# Patient Record
Sex: MALE | Race: OTHER | HISPANIC OR LATINO | URBAN - METROPOLITAN AREA
[De-identification: names, ages, dates, MRNs, and addresses within clinical notes are randomized per-mention and may not be internally consistent; named-entity substitution may affect disease eponyms.]

---

## 2017-02-03 ENCOUNTER — OUTPATIENT (OUTPATIENT)
Dept: OUTPATIENT SERVICES | Facility: HOSPITAL | Age: 42
LOS: 1 days | End: 2017-02-03
Payer: MEDICAID

## 2017-02-03 ENCOUNTER — APPOINTMENT (OUTPATIENT)
Dept: MRI IMAGING | Facility: CLINIC | Age: 42
End: 2017-02-03

## 2017-02-03 DIAGNOSIS — D36.10 BENIGN NEOPLASM OF PERIPHERAL NERVES AND AUTONOMIC NERVOUS SYSTEM, UNSPECIFIED: ICD-10-CM

## 2017-02-03 PROCEDURE — 82565 ASSAY OF CREATININE: CPT

## 2017-02-03 PROCEDURE — 70553 MRI BRAIN STEM W/O & W/DYE: CPT

## 2017-02-03 PROCEDURE — A9585: CPT

## 2017-02-13 ENCOUNTER — APPOINTMENT (OUTPATIENT)
Dept: OTOLARYNGOLOGY | Facility: CLINIC | Age: 42
End: 2017-02-13

## 2017-02-13 VITALS
SYSTOLIC BLOOD PRESSURE: 127 MMHG | HEART RATE: 97 BPM | WEIGHT: 184 LBS | HEIGHT: 62 IN | BODY MASS INDEX: 33.86 KG/M2 | DIASTOLIC BLOOD PRESSURE: 67 MMHG

## 2017-03-01 ENCOUNTER — APPOINTMENT (OUTPATIENT)
Dept: SPINE | Facility: CLINIC | Age: 42
End: 2017-03-01

## 2017-03-01 VITALS
HEIGHT: 61 IN | DIASTOLIC BLOOD PRESSURE: 79 MMHG | HEART RATE: 111 BPM | SYSTOLIC BLOOD PRESSURE: 113 MMHG | BODY MASS INDEX: 35.87 KG/M2 | WEIGHT: 190 LBS

## 2017-03-01 RX ORDER — DEXTROAMPHETAMINE SACCHARATE AND AMPHETAMINE ASPARTATE AND DEXTROAMPHETAMINE SULFATE AND AMPHETAMINE SULFATE 3.75; 3.75; 3.75; 3.75 MG/1; MG/1; MG/1; MG/1
15 TABLET ORAL
Refills: 0 | Status: ACTIVE | COMMUNITY

## 2017-03-01 RX ORDER — CLONAZEPAM 0.5 MG/1
0.5 TABLET ORAL
Refills: 0 | Status: ACTIVE | COMMUNITY

## 2017-03-01 RX ORDER — CALCIUM CARBONATE/VITAMIN D3 600 MG-10
TABLET ORAL
Refills: 0 | Status: ACTIVE | COMMUNITY

## 2017-06-12 ENCOUNTER — APPOINTMENT (OUTPATIENT)
Dept: OTOLARYNGOLOGY | Facility: CLINIC | Age: 42
End: 2017-06-12

## 2017-06-12 VITALS
SYSTOLIC BLOOD PRESSURE: 123 MMHG | DIASTOLIC BLOOD PRESSURE: 82 MMHG | HEIGHT: 61 IN | BODY MASS INDEX: 35.87 KG/M2 | HEART RATE: 110 BPM | WEIGHT: 190 LBS

## 2017-06-12 DIAGNOSIS — H40.003 PREGLAUCOMA, UNSPECIFIED, BILATERAL: ICD-10-CM

## 2017-12-11 ENCOUNTER — APPOINTMENT (OUTPATIENT)
Dept: OTOLARYNGOLOGY | Facility: CLINIC | Age: 42
End: 2017-12-11
Payer: MEDICAID

## 2017-12-11 VITALS
HEART RATE: 84 BPM | BODY MASS INDEX: 35.87 KG/M2 | DIASTOLIC BLOOD PRESSURE: 74 MMHG | WEIGHT: 190 LBS | SYSTOLIC BLOOD PRESSURE: 117 MMHG | HEIGHT: 61 IN

## 2017-12-11 PROCEDURE — 92557 COMPREHENSIVE HEARING TEST: CPT

## 2017-12-11 PROCEDURE — 99214 OFFICE O/P EST MOD 30 MIN: CPT | Mod: 25

## 2017-12-11 PROCEDURE — 92567 TYMPANOMETRY: CPT

## 2018-03-06 ENCOUNTER — APPOINTMENT (OUTPATIENT)
Dept: MRI IMAGING | Facility: CLINIC | Age: 43
End: 2018-03-06
Payer: MEDICAID

## 2018-03-06 ENCOUNTER — OUTPATIENT (OUTPATIENT)
Dept: OUTPATIENT SERVICES | Facility: HOSPITAL | Age: 43
LOS: 1 days | End: 2018-03-06
Payer: MEDICAID

## 2018-03-06 DIAGNOSIS — D36.10 BENIGN NEOPLASM OF PERIPHERAL NERVES AND AUTONOMIC NERVOUS SYSTEM, UNSPECIFIED: ICD-10-CM

## 2018-03-06 PROCEDURE — A9585: CPT

## 2018-03-06 PROCEDURE — 70553 MRI BRAIN STEM W/O & W/DYE: CPT | Mod: 26

## 2018-03-06 PROCEDURE — 70553 MRI BRAIN STEM W/O & W/DYE: CPT

## 2018-03-28 ENCOUNTER — APPOINTMENT (OUTPATIENT)
Dept: SPINE | Facility: CLINIC | Age: 43
End: 2018-03-28
Payer: MEDICAID

## 2018-03-28 VITALS
HEART RATE: 102 BPM | BODY MASS INDEX: 29.83 KG/M2 | WEIGHT: 158 LBS | HEIGHT: 61 IN | DIASTOLIC BLOOD PRESSURE: 71 MMHG | SYSTOLIC BLOOD PRESSURE: 103 MMHG

## 2018-03-28 PROCEDURE — 99214 OFFICE O/P EST MOD 30 MIN: CPT

## 2018-03-28 RX ORDER — FLUTICASONE PROPIONATE 50 UG/1
50 SPRAY, METERED NASAL DAILY
Qty: 1 | Refills: 10 | Status: COMPLETED | COMMUNITY
Start: 2017-06-12 | End: 2018-03-28

## 2018-03-28 RX ORDER — FLUTICASONE PROPIONATE 50 UG/1
50 SPRAY, METERED NASAL DAILY
Qty: 1 | Refills: 10 | Status: COMPLETED | COMMUNITY
Start: 2017-02-13 | End: 2018-03-28

## 2018-04-16 ENCOUNTER — APPOINTMENT (OUTPATIENT)
Dept: OTOLARYNGOLOGY | Facility: CLINIC | Age: 43
End: 2018-04-16
Payer: MEDICAID

## 2018-04-16 VITALS
HEIGHT: 61 IN | BODY MASS INDEX: 30.02 KG/M2 | HEART RATE: 82 BPM | DIASTOLIC BLOOD PRESSURE: 77 MMHG | SYSTOLIC BLOOD PRESSURE: 123 MMHG | WEIGHT: 159 LBS

## 2018-04-16 PROCEDURE — 99214 OFFICE O/P EST MOD 30 MIN: CPT | Mod: 25

## 2018-04-16 PROCEDURE — 69210 REMOVE IMPACTED EAR WAX UNI: CPT

## 2018-04-16 RX ORDER — NEOMYCIN AND POLYMYXIN B SULFATES AND HYDROCORTISONE OTIC 10; 3.5; 1 MG/ML; MG/ML; [USP'U]/ML
3.5-10000-1 SUSPENSION AURICULAR (OTIC)
Qty: 10 | Refills: 5 | Status: ACTIVE | COMMUNITY
Start: 2018-04-16 | End: 1900-01-01

## 2018-04-30 ENCOUNTER — APPOINTMENT (OUTPATIENT)
Dept: OTOLARYNGOLOGY | Facility: CLINIC | Age: 43
End: 2018-04-30
Payer: MEDICAID

## 2018-04-30 VITALS
WEIGHT: 159 LBS | SYSTOLIC BLOOD PRESSURE: 112 MMHG | HEIGHT: 61 IN | BODY MASS INDEX: 30.02 KG/M2 | DIASTOLIC BLOOD PRESSURE: 66 MMHG | HEART RATE: 65 BPM

## 2018-04-30 PROCEDURE — 69210 REMOVE IMPACTED EAR WAX UNI: CPT

## 2018-04-30 PROCEDURE — 99213 OFFICE O/P EST LOW 20 MIN: CPT | Mod: 25

## 2018-08-20 ENCOUNTER — APPOINTMENT (OUTPATIENT)
Dept: OPHTHALMOLOGY | Facility: CLINIC | Age: 43
End: 2018-08-20
Payer: MEDICAID

## 2018-08-20 PROCEDURE — 92014 COMPRE OPH EXAM EST PT 1/>: CPT

## 2018-08-20 PROCEDURE — 92015 DETERMINE REFRACTIVE STATE: CPT

## 2018-08-29 ENCOUNTER — APPOINTMENT (OUTPATIENT)
Dept: OTOLARYNGOLOGY | Facility: CLINIC | Age: 43
End: 2018-08-29

## 2018-09-27 ENCOUNTER — APPOINTMENT (OUTPATIENT)
Dept: OTOLARYNGOLOGY | Facility: CLINIC | Age: 43
End: 2018-09-27
Payer: MEDICAID

## 2018-09-27 VITALS
HEART RATE: 67 BPM | SYSTOLIC BLOOD PRESSURE: 100 MMHG | DIASTOLIC BLOOD PRESSURE: 64 MMHG | HEIGHT: 61 IN | BODY MASS INDEX: 30.02 KG/M2 | WEIGHT: 159 LBS

## 2018-09-27 PROCEDURE — 69220 CLEAN OUT MASTOID CAVITY: CPT | Mod: LT

## 2018-09-27 PROCEDURE — 92504 EAR MICROSCOPY EXAMINATION: CPT | Mod: 59

## 2018-09-27 PROCEDURE — 99214 OFFICE O/P EST MOD 30 MIN: CPT | Mod: 25

## 2019-01-17 ENCOUNTER — APPOINTMENT (OUTPATIENT)
Dept: OTOLARYNGOLOGY | Facility: CLINIC | Age: 44
End: 2019-01-17
Payer: MEDICAID

## 2019-01-17 VITALS
SYSTOLIC BLOOD PRESSURE: 112 MMHG | DIASTOLIC BLOOD PRESSURE: 66 MMHG | HEIGHT: 61 IN | WEIGHT: 166 LBS | BODY MASS INDEX: 31.34 KG/M2 | HEART RATE: 81 BPM

## 2019-01-17 PROCEDURE — 99214 OFFICE O/P EST MOD 30 MIN: CPT | Mod: 25

## 2019-01-17 PROCEDURE — 69210 REMOVE IMPACTED EAR WAX UNI: CPT

## 2019-01-17 NOTE — REASON FOR VISIT
[Subsequent Evaluation] : a subsequent evaluation for [Hearing Loss] : hearing loss [Nasal Obstruction] : nasal obstruction

## 2019-01-17 NOTE — PHYSICAL EXAM
[Nasal Endoscopy Performed] : nasal endoscopy was performed, see procedure section for findings [] : septum deviated bilaterally [de-identified] : congested [Midline] : trachea located in midline position [Binocular Microscopic Exam] : Binocular microscopic exam was performed [Normal] : the right mastoid was normal [Hearing Loss Right Only] : normal [Hearing Loss Left Only] : normal [FreeTextEntry8] : wax [FreeTextEntry9] : wax [de-identified] : attic defect debrided on scant debris [de-identified] : debris and cerumen moist debrided and otic drops placed

## 2019-01-17 NOTE — ASSESSMENT
[FreeTextEntry1] : significant history of cholesteatoma and chronic mastoiditis in the left ear\par With a significant history of a left acoustic neuroma treated with radiation therapy\par The mastoid is now clean  \par needs routine cleaning\par \par Right  ear:\par After informed verbal consent is obtained, cerumen is removed from the right and left  ear canal with a curette and suction.\par Rec: \par Debrox was prescribed and  is to be placed in both ears on a routine basis to keep them free of wax.\par Routine debridement suggested to keep the ears free of wax.\par \par bilateral sensorineural hearing loss-cleared for hearing aids\par Sugery not indicated for right ear since it is the better hearing ear

## 2019-01-17 NOTE — HISTORY OF PRESENT ILLNESS
[T plasty] : tympanoplasty [Mastoid] : mastoid surgery [Yes] : patient has a history radiation therapy [de-identified] : 42 yo\par no ear drainage bilat\par interested in hearing better\par Right ear better hearing ear\par AVELINA-stable\par hx-\par patient has a significant history of a left acoustic neuroma and cholesteatoma\par He has been treated with a left mastoidectomy for removal of cholesteatoma and radiation therapy for the acoustic neuroma\par there is a significant history of nasal congestion and AVELINA-now much less due to 30 loss\par congestion despite nasal sprays\par  [Tinnitus] : tinnitus [Hearing Loss] : hearing loss [Acoustic Neuroma] : acoustic neuroma [Cholesteatoma] : cholesteatoma [Nasal Congestion] : nasal congestion [None] : No risk factors have been identified.

## 2019-03-26 ENCOUNTER — OTHER (OUTPATIENT)
Age: 44
End: 2019-03-26

## 2019-04-01 ENCOUNTER — OUTPATIENT (OUTPATIENT)
Dept: OUTPATIENT SERVICES | Facility: HOSPITAL | Age: 44
LOS: 1 days | End: 2019-04-01
Payer: MEDICAID

## 2019-04-01 ENCOUNTER — APPOINTMENT (OUTPATIENT)
Age: 44
End: 2019-04-01
Payer: MEDICAID

## 2019-04-01 DIAGNOSIS — D33.3 BENIGN NEOPLASM OF CRANIAL NERVES: ICD-10-CM

## 2019-04-01 PROCEDURE — 70553 MRI BRAIN STEM W/O & W/DYE: CPT | Mod: 26

## 2019-04-01 PROCEDURE — 70553 MRI BRAIN STEM W/O & W/DYE: CPT

## 2019-04-01 PROCEDURE — A9585: CPT

## 2019-04-03 ENCOUNTER — APPOINTMENT (OUTPATIENT)
Dept: SPINE | Facility: CLINIC | Age: 44
End: 2019-04-03
Payer: MEDICAID

## 2019-04-03 VITALS
BODY MASS INDEX: 31.72 KG/M2 | DIASTOLIC BLOOD PRESSURE: 73 MMHG | HEIGHT: 61 IN | HEART RATE: 76 BPM | WEIGHT: 168 LBS | SYSTOLIC BLOOD PRESSURE: 112 MMHG

## 2019-04-03 DIAGNOSIS — D33.3 BENIGN NEOPLASM OF CRANIAL NERVES: ICD-10-CM

## 2019-04-03 PROCEDURE — 99214 OFFICE O/P EST MOD 30 MIN: CPT

## 2019-04-05 ENCOUNTER — OTHER (OUTPATIENT)
Age: 44
End: 2019-04-05

## 2019-05-15 ENCOUNTER — APPOINTMENT (OUTPATIENT)
Dept: OTOLARYNGOLOGY | Facility: CLINIC | Age: 44
End: 2019-05-15

## 2019-07-15 ENCOUNTER — LABORATORY RESULT (OUTPATIENT)
Age: 44
End: 2019-07-15

## 2019-07-15 ENCOUNTER — APPOINTMENT (OUTPATIENT)
Dept: OTOLARYNGOLOGY | Facility: CLINIC | Age: 44
End: 2019-07-15
Payer: MEDICAID

## 2019-07-15 VITALS
BODY MASS INDEX: 31.72 KG/M2 | HEART RATE: 80 BPM | DIASTOLIC BLOOD PRESSURE: 80 MMHG | SYSTOLIC BLOOD PRESSURE: 119 MMHG | WEIGHT: 168 LBS | HEIGHT: 61 IN

## 2019-07-15 PROCEDURE — 92504 EAR MICROSCOPY EXAMINATION: CPT | Mod: 59

## 2019-07-15 PROCEDURE — 99214 OFFICE O/P EST MOD 30 MIN: CPT | Mod: 25

## 2019-07-15 PROCEDURE — 69540 EXCISION AURAL POLYP: CPT | Mod: LT

## 2019-07-15 RX ORDER — SULFACETAMIDE SODIUM AND PREDNISOLONE SODIUM PHOSPHATE 100; 2.3 MG/ML; MG/ML
10-0.23 SOLUTION/ DROPS OPHTHALMIC
Qty: 2 | Refills: 2 | Status: ACTIVE | COMMUNITY
Start: 2019-07-15 | End: 1900-01-01

## 2019-07-15 NOTE — REVIEW OF SYSTEMS
[Patient Intake Form Reviewed] : Patient intake form was reviewed [As Noted in HPI] : as noted in HPI [Negative] : Heme/Lymph [Ear Pain] : ear pain [Hearing Loss] : hearing loss [Ear Drainage] : ear drainage [Nasal Congestion] : nasal congestion

## 2019-07-15 NOTE — DATA REVIEWED
[No studies available for review at this time] : No studies available for review at this time [de-identified] : stable hearing bilaterally

## 2019-07-15 NOTE — REASON FOR VISIT
[Subsequent Evaluation] : a subsequent evaluation for [Hearing Loss] : hearing loss [Nasal Obstruction] : nasal obstruction [FreeTextEntry2] : L ear drainage

## 2019-07-15 NOTE — PROCEDURE
[Risk and Benefits Discussed] : The purpose, risks, discomforts, benefits and alternatives of the procedure have been explained to the patient including no treatment. [Cerumen Impaction] : Cerumen Impaction [Same] : same as the Pre Op Dx. [] : Removal of Cerumen [FreeTextEntry1] : left aural polyp chr drainage [FreeTextEntry6] : the microscope was necessary for illumination and magnification\par mastoid debrided,wax removed w/ suction and currette\par Ant/sup aural polyp noted in middle ear space

## 2019-07-15 NOTE — ASSESSMENT
[FreeTextEntry1] : 43 y/o male w/ significant history of cholesteatoma and chronic mastoiditis in the left ear and left acoustic neuroma treated with radiation therapy, He is now having L ear drainage and intermittent pain. L ear polyp and moist debris is noted in L ear canal on exam\par \par L ear polyp was removed in office and specimen will be sent out to pathology\par L  chr mastoidits\par -Rx: belphamide drops to left ear \par \par bilateral sensorineural hearing loss-continue using hearing aide in R ear as it is helping him\par \par Will f/u at the end of July \par h2o precautions

## 2019-07-15 NOTE — PHYSICAL EXAM
[] : septum deviated bilaterally [Midline] : trachea located in midline position [Binocular Microscopic Exam] : Binocular microscopic exam was performed [Normal] : the right mastoid was normal [de-identified] : L polyp and pus noted in L ear  [de-identified] : healed perforation of TM  [de-identified] : congested [Hearing Loss Right Only] : normal [Hearing Loss Left Only] : normal [FreeTextEntry8] : wax [FreeTextEntry9] : wax [de-identified] : attic defect debrided on scant debris [de-identified] : debris and cerumen moist debrided and otic drops placed

## 2019-07-15 NOTE — HISTORY OF PRESENT ILLNESS
[T plasty] : tympanoplasty [Mastoid] : mastoid surgery [Yes] : patient has a history radiation therapy [Tinnitus] : tinnitus [Hearing Loss] : hearing loss [Acoustic Neuroma] : acoustic neuroma [Cholesteatoma] : cholesteatoma [Nasal Congestion] : nasal congestion [None] : No risk factors have been identified. [de-identified] : 43 y/o male w/ significant history of of AVELINA, left acoustic neuroma and cholesteatoma. He has been treated with a left mastoidectomy for removal of cholesteatoma and radiation therapy for the acoustic neuroma. He is here for his annual f/u. He state his L ear has been draining for a couple of months. He has been having intermittent L ear pain. His hearing has diminished in his L ear. He is using a hearing aide in the R ear which is helping him. He states he has been having mild nasal congestion on a daily basis. He has no other modifying factors. Pt has no vertigo,nasal discharge, epistaxis, sinus infections, facial pain, facial pressure, throat pain, dysphagia or fevers.\par

## 2019-07-16 ENCOUNTER — MESSAGE (OUTPATIENT)
Age: 44
End: 2019-07-16

## 2019-08-08 ENCOUNTER — APPOINTMENT (OUTPATIENT)
Dept: OTOLARYNGOLOGY | Facility: CLINIC | Age: 44
End: 2019-08-08
Payer: MEDICAID

## 2019-08-08 VITALS
DIASTOLIC BLOOD PRESSURE: 75 MMHG | HEART RATE: 75 BPM | SYSTOLIC BLOOD PRESSURE: 113 MMHG | WEIGHT: 178 LBS | HEIGHT: 61 IN | BODY MASS INDEX: 33.61 KG/M2

## 2019-08-08 DIAGNOSIS — D36.10 BENIGN NEOPLASM OF PERIPHERAL NERVES AND AUTONOMIC NERVOUS SYSTEM, UNSPECIFIED: ICD-10-CM

## 2019-08-08 PROCEDURE — 69540 EXCISION AURAL POLYP: CPT | Mod: LT

## 2019-08-08 PROCEDURE — 99214 OFFICE O/P EST MOD 30 MIN: CPT | Mod: 25

## 2019-08-08 NOTE — DATA REVIEWED
[No studies available for review at this time] : No studies available for review at this time [de-identified] : stable hearing bilaterally

## 2019-08-08 NOTE — HISTORY OF PRESENT ILLNESS
[T plasty] : tympanoplasty [Mastoid] : mastoid surgery [Yes] : patient has a history radiation therapy [Tinnitus] : tinnitus [Hearing Loss] : hearing loss [Acoustic Neuroma] : acoustic neuroma [Cholesteatoma] : cholesteatoma [Nasal Congestion] : nasal congestion [None] : No associated symptoms are reported. [de-identified] : 43 y/o male w/ significant history of of AVELINA, left acoustic neuroma and cholesteatoma. He has been treated with a left mastoidectomy for removal of cholesteatoma and radiation therapy for the acoustic neuroma. He is here for his annual f/u. He state his L ear is draining less compared to last visit. He has occasional pain in his L ear. His hearing is still diminished in his L ear. He has ringing in the L ear. He is using a hearing aide in the R ear which is helping him. He states he is still having mild nasal congestion on a daily basis. He has no other modifying factors. Pt has no vertigo,nasal discharge, epistaxis, sinus infections, facial pain, facial pressure, throat pain, dysphagia or fevers.\par

## 2019-08-08 NOTE — PROCEDURE
[Risk and Benefits Discussed] : The purpose, risks, discomforts, benefits and alternatives of the procedure have been explained to the patient including no treatment. [Cerumen Impaction] : Cerumen Impaction [Same] : same as the Pre Op Dx. [] : Removal of Cerumen [FreeTextEntry6] : the microscope was necessary for illumination and magnification\par mastoid debrided,wax removed w/ suction and currette\par post/sup aural polyp noted in middle ear space\par removed and AgNO3 appiled [FreeTextEntry1] : left aural polyp chr drainage

## 2019-08-08 NOTE — ASSESSMENT
[FreeTextEntry1] : 43 y/o male w/ significant history of cholesteatoma and chronic mastoiditis in the left ear and left acoustic neuroma treated with radiation therapy, He continues to have mild L ear drainage and intermittent pain. L ear polyp and moist debris is noted in L ear canal on exam. He has chronic nasal congestion. Silver nitrate was used to cauterize L ear polyp\par \par L ear polyp: \par Removed and AgNO3 applied\par -Rx: Cortisporin drops for 10 days \par -bilateral sensorineural hearing loss-continue using hearing aide in R ear as it is helping him\par -h2o precautions\par Keep hearing aid out\par DNS and Rhinitis\par Rx\par   Steam humidification and hypertonic saline nasal irrigations \par \par \par Will f/u in 7-10 days

## 2019-08-08 NOTE — REVIEW OF SYSTEMS
[Patient Intake Form Reviewed] : Patient intake form was reviewed [Ear Pain] : ear pain [Hearing Loss] : hearing loss [Ear Drainage] : ear drainage [As Noted in HPI] : as noted in HPI [Nasal Congestion] : nasal congestion [Negative] : Heme/Lymph

## 2019-08-08 NOTE — PHYSICAL EXAM
[] : septum deviated bilaterally [Midline] : trachea located in midline position [Binocular Microscopic Exam] : Binocular microscopic exam was performed [Normal] : the right mastoid was normal [de-identified] :  polyp and moist debris in L ear canal, R normal  [de-identified] : healed perforation of TM  [de-identified] : congested [Hearing Loss Right Only] : normal [Hearing Loss Left Only] : normal [FreeTextEntry9] : wax [de-identified] : attic defect debrided on scant debris [FreeTextEntry8] : wax [de-identified] : debris and cerumen moist debrided and otic drops placed

## 2019-08-16 ENCOUNTER — APPOINTMENT (OUTPATIENT)
Dept: OTOLARYNGOLOGY | Facility: CLINIC | Age: 44
End: 2019-08-16
Payer: MEDICAID

## 2019-08-16 VITALS
HEIGHT: 61 IN | SYSTOLIC BLOOD PRESSURE: 110 MMHG | DIASTOLIC BLOOD PRESSURE: 72 MMHG | WEIGHT: 178 LBS | BODY MASS INDEX: 33.61 KG/M2 | HEART RATE: 78 BPM

## 2019-08-16 PROCEDURE — 99024 POSTOP FOLLOW-UP VISIT: CPT

## 2019-08-16 PROCEDURE — 92504 EAR MICROSCOPY EXAMINATION: CPT | Mod: 59

## 2019-08-16 PROCEDURE — 69220 CLEAN OUT MASTOID CAVITY: CPT | Mod: LT,58

## 2019-08-16 NOTE — ASSESSMENT
[FreeTextEntry1] : 43 y/o male w/ significant history of cholesteatoma and chronic mastoiditis in the left ear and left acoustic neuroma treated with radiation therapy, He continues to have mild L ear drainage and intermittent pain. He has L fungal mastoid/OE on exam. L ear was debrided and genta-violet was placed.\par \par L fungal OM/OE:\par -bilateral sensorineural hearing loss-continue using hearing aide in R ear as it is helping him\par -h2o precautions\par Keep hearing aid out\par \par \par Will f/u in 7-10 days

## 2019-08-16 NOTE — REVIEW OF SYSTEMS
[Patient Intake Form Reviewed] : Patient intake form was reviewed [As Noted in HPI] : as noted in HPI [Ear Pain] : ear pain [Hearing Loss] : hearing loss [Ear Drainage] : ear drainage [Negative] : Nasal

## 2019-08-16 NOTE — PROCEDURE
[Risk and Benefits Discussed] : The purpose, risks, discomforts, benefits and alternatives of the procedure have been explained to the patient including no treatment. [Cerumen Impaction] : Cerumen Impaction [Same] : same as the Pre Op Dx. [] : Removal of Cerumen [FreeTextEntry1] : left aural polyp chr drainage [FreeTextEntry6] : the microscope was necessary for illumination and magnification\par mastoid debrided,wax removed w/ suction and currette\par post/sup aural polyp noted in middle ear space\par gentian violet placed after removal of fungal debris\par removed and AgNO3 appiled

## 2019-08-16 NOTE — PHYSICAL EXAM
[] : septum deviated bilaterally [Midline] : trachea located in midline position [Binocular Microscopic Exam] : Binocular microscopic exam was performed [Normal] : the right mastoid was normal [de-identified] : Fungal moist debris in L ear canal, R normal  [de-identified] : congested [de-identified] : L Fungal OM, R normal  [Hearing Loss Right Only] : normal [de-identified] : fungal debris [Hearing Loss Left Only] : normal [de-identified] : fungal debris and cerumen

## 2019-08-16 NOTE — HISTORY OF PRESENT ILLNESS
[T plasty] : tympanoplasty [Mastoid] : mastoid surgery [Yes] : patient has a history radiation therapy [Tinnitus] : tinnitus [Hearing Loss] : hearing loss [Acoustic Neuroma] : acoustic neuroma [Cholesteatoma] : cholesteatoma [None] : No risk factors have been identified. [de-identified] : 45 y/o male w/ significant history of of AVELINA, left acoustic neuroma and cholesteatoma. He has been treated with a left mastoidectomy for removal of cholesteatoma and radiation therapy for the acoustic neuroma. He is here for f/u for L ear drainage. His L ear is still continuing to drain w/ occasional pain in his L ear. His hearing is still diminished in his L ear. He has ringing in the L ear. He is using a hearing aide in the R ear which is helping him. His nasal congestion has resolved.  He has no other modifying factors. Pt has no vertigo, nasal congestion, nasal discharge, epistaxis, sinus infections, facial pain, facial pressure, throat pain, dysphagia or fevers\par \par  [Facial Pain] : no facial pain [Clear Rhinorrhea] : no clear rhinorrhea [Facial Pressure] : no facial pressure [Purulent Rhinorrhea] : no purulent rhinorrhea [Nasal Congestion] : no nasal congestion [Postnasal Drainage] : no postnasal drainage

## 2019-08-16 NOTE — DATA REVIEWED
[No studies available for review at this time] : No studies available for review at this time [de-identified] : stable hearing bilaterally

## 2019-09-06 ENCOUNTER — APPOINTMENT (OUTPATIENT)
Dept: OTOLARYNGOLOGY | Facility: CLINIC | Age: 44
End: 2019-09-06
Payer: MEDICAID

## 2019-09-06 VITALS
WEIGHT: 178 LBS | DIASTOLIC BLOOD PRESSURE: 69 MMHG | HEART RATE: 72 BPM | BODY MASS INDEX: 33.61 KG/M2 | HEIGHT: 61 IN | SYSTOLIC BLOOD PRESSURE: 116 MMHG

## 2019-09-06 DIAGNOSIS — H90.8 MIXED CONDUCTIVE AND SENSORINEURAL HEARING LOSS, UNSPECIFIED: ICD-10-CM

## 2019-09-06 DIAGNOSIS — B36.9 SUPERFICIAL MYCOSIS, UNSPECIFIED: ICD-10-CM

## 2019-09-06 DIAGNOSIS — H62.42 SUPERFICIAL MYCOSIS, UNSPECIFIED: ICD-10-CM

## 2019-09-06 DIAGNOSIS — H92.02 OTALGIA, LEFT EAR: ICD-10-CM

## 2019-09-06 PROCEDURE — 69540 EXCISION AURAL POLYP: CPT | Mod: LT

## 2019-09-06 PROCEDURE — 92504 EAR MICROSCOPY EXAMINATION: CPT | Mod: 59

## 2019-09-06 PROCEDURE — 99214 OFFICE O/P EST MOD 30 MIN: CPT | Mod: 25

## 2019-09-06 RX ORDER — TOBRAMYCIN AND DEXAMETHASONE 3; 1 MG/ML; MG/ML
0.3-0.1 SUSPENSION/ DROPS OPHTHALMIC
Qty: 1 | Refills: 3 | Status: DISCONTINUED | COMMUNITY
Start: 2019-09-06 | End: 2019-09-06

## 2019-09-06 RX ORDER — TOBRAMYCIN AND DEXAMETHASONE 3; 1 MG/ML; MG/ML
0.3-0.1 SUSPENSION/ DROPS OPHTHALMIC
Qty: 1 | Refills: 0 | Status: ACTIVE | COMMUNITY
Start: 2019-09-06 | End: 1900-01-01

## 2019-09-06 NOTE — REVIEW OF SYSTEMS
[Patient Intake Form Reviewed] : Patient intake form was reviewed [As Noted in HPI] : as noted in HPI [Hearing Loss] : hearing loss [Ear Pain] : ear pain [Ear Drainage] : ear drainage [Negative] : Endocrine [Ear Noises] : ear noises

## 2019-09-06 NOTE — HISTORY OF PRESENT ILLNESS
[T plasty] : tympanoplasty [Mastoid] : mastoid surgery [Yes] : patient has a history radiation therapy [Tinnitus] : tinnitus [Hearing Loss] : hearing loss [Acoustic Neuroma] : acoustic neuroma [Cholesteatoma] : cholesteatoma [None] : No associated symptoms are reported. [Otalgia] : otalgia [Otorrhea] : otorrhea [Ear Pain] : ear pain [de-identified] : 43 y/o male w/ significant history of of AVELINA, left acoustic neuroma and cholesteatoma. He has been treated with a left mastoidectomy for removal of cholesteatoma and radiation therapy for the acoustic neuroma. He is here for f/u for L ear drainage. His L ear is still continuing to drain on a daily basis w/ occasional pain in his L ear. His hearing is still diminished in his L ear. He has ringing in the L ear. He is using a hearing aide in the R ear which is helping him. He has no other ENT complaints. Pt has no vertigo, nasal congestion, nasal discharge, epistaxis, sinus infections, facial pain, facial pressure, throat pain, dysphagia or fevers\par \par \par  [Facial Pain] : no facial pain [Clear Rhinorrhea] : no clear rhinorrhea [Facial Pressure] : no facial pressure [Purulent Rhinorrhea] : no purulent rhinorrhea [Postnasal Drainage] : no postnasal drainage [Nasal Congestion] : no nasal congestion

## 2019-09-06 NOTE — PROCEDURE
[Risk and Benefits Discussed] : The purpose, risks, discomforts, benefits and alternatives of the procedure have been explained to the patient including no treatment. [Cerumen Impaction] : Cerumen Impaction [Same] : same as the Pre Op Dx. [] : Aural Polyp [FreeTextEntry1] : left aural polyp chr drainage [FreeTextEntry6] : the microscope was necessary for illumination and magnification\par mastoid debrided,wax removed w/ suction and currette\par post/sup aural polyp noted in middle ear space-removed w/ suction and alligator forceps\par otic drops placed\par removed and AgNO3 appiled

## 2019-09-06 NOTE — ASSESSMENT
[FreeTextEntry1] : 45 y/o male w/ significant history of cholesteatoma and chronic mastoiditis in the left ear and left acoustic neuroma treated with radiation therapy, He continues to have L ear drainage and intermittent pain. He has L fungal mastoid/OE on exam that is improving. His L ear was debrided and irrigated. L polyp removed from ear canal.  \par \par L chr mastoiditis r/o cholesdteatoma\par -bilateral sensorineural hearing loss-continue using hearing aide in R ear as it is helping him\par -Rx: TobraDex drops bid \par -h2o precautions\par Keep hearing aid out of Left ear \par poss surgery\par \par \par Will f/u in 7 days

## 2019-09-06 NOTE — DATA REVIEWED
[No studies available for review at this time] : No studies available for review at this time [de-identified] : stable hearing bilaterally

## 2019-09-06 NOTE — PHYSICAL EXAM
[] : septum deviated bilaterally [Midline] : trachea located in midline position [Binocular Microscopic Exam] : Binocular microscopic exam was performed [Normal] : inferior turbinates and middle turbinates are normal [de-identified] : Fungal moist debris in L ear canal, R normal  [de-identified] : L Fungal OM and polyp, R normal  [Hearing Loss Right Only] : normal [Hearing Loss Left Only] : normal [de-identified] : fungal debris [de-identified] : fungal debris and cerumen

## 2019-09-11 ENCOUNTER — APPOINTMENT (OUTPATIENT)
Dept: OTOLARYNGOLOGY | Facility: CLINIC | Age: 44
End: 2019-09-11
Payer: MEDICAID

## 2019-09-11 VITALS
HEIGHT: 61 IN | BODY MASS INDEX: 33.61 KG/M2 | HEART RATE: 73 BPM | WEIGHT: 178 LBS | SYSTOLIC BLOOD PRESSURE: 118 MMHG | DIASTOLIC BLOOD PRESSURE: 74 MMHG

## 2019-09-11 PROCEDURE — 99213 OFFICE O/P EST LOW 20 MIN: CPT | Mod: 25

## 2019-09-11 PROCEDURE — 69540 EXCISION AURAL POLYP: CPT | Mod: LT,58

## 2019-09-11 NOTE — PHYSICAL EXAM
[de-identified] : Fungal moist debris in L ear canal, R normal  [de-identified] : L Fungal OM and polyp, R normal  [] : septum deviated bilaterally [Midline] : trachea located in midline position [Binocular Microscopic Exam] : Binocular microscopic exam was performed [Hearing Loss Right Only] : normal [Hearing Loss Left Only] : normal [de-identified] : fungal debris and cerumen  [de-identified] : fungal debris [Normal] : salivary glands are normal

## 2019-09-11 NOTE — PROCEDURE
[Risk and Benefits Discussed] : The purpose, risks, discomforts, benefits and alternatives of the procedure have been explained to the patient including no treatment. [Cerumen Impaction] : Cerumen Impaction [Same] : same as the Pre Op Dx. [] : Removal of Aural Polyp [FreeTextEntry1] : left aural polyp chr drainage [FreeTextEntry6] : the microscope was necessary for illumination and magnification\par mastoid debrided,wax removed w/ suction and currette\par post/sup aural polyp noted in middle ear space-removed w/ suction and alligator forceps\par otic drops placed\par removed and AgNO3 appiled

## 2019-09-11 NOTE — HISTORY OF PRESENT ILLNESS
[T plasty] : tympanoplasty [Mastoid] : mastoid surgery [Yes] : patient has a history radiation therapy [de-identified] : 43 y/o male w/ significant history of of AVELINA, left acoustic neuroma and cholesteatoma. He has been treated with a left mastoidectomy for removal of cholesteatoma and radiation therapy for the acoustic neuroma. He is here for f/u for L ear drainage. His L ear is still continuing to drain on a daily basis w/ occasional pain in his L ear. His hearing is still diminished in his L ear. He has ringing in the L ear. He is using a hearing aide in the R ear which is helping him. He has no other ENT complaints. Pt has no vertigo, nasal congestion, nasal discharge, epistaxis, sinus infections, facial pain, facial pressure, throat pain, dysphagia or fevers\par \par \par  [Tinnitus] : tinnitus [Hearing Loss] : hearing loss [Otorrhea] : otorrhea [Otalgia] : otalgia [Acoustic Neuroma] : acoustic neuroma [Cholesteatoma] : cholesteatoma [Clear Rhinorrhea] : no clear rhinorrhea [Facial Pain] : no facial pain [Facial Pressure] : no facial pressure [Purulent Rhinorrhea] : no purulent rhinorrhea [Nasal Congestion] : no nasal congestion [Postnasal Drainage] : no postnasal drainage [Ear Pain] : ear pain [None] : No risk factors have been identified.

## 2019-09-11 NOTE — REVIEW OF SYSTEMS
[Patient Intake Form Reviewed] : Patient intake form was reviewed [As Noted in HPI] : as noted in HPI [Hearing Loss] : hearing loss [Ear Pain] : ear pain [Ear Noises] : ear noises [Ear Drainage] : ear drainage [Negative] : Heme/Lymph

## 2019-09-11 NOTE — DATA REVIEWED
[No studies available for review at this time] : No studies available for review at this time [de-identified] : stable hearing bilaterally

## 2019-09-23 ENCOUNTER — APPOINTMENT (OUTPATIENT)
Dept: OTOLARYNGOLOGY | Facility: CLINIC | Age: 44
End: 2019-09-23
Payer: MEDICAID

## 2019-09-23 VITALS
HEIGHT: 61 IN | HEART RATE: 93 BPM | BODY MASS INDEX: 33.61 KG/M2 | SYSTOLIC BLOOD PRESSURE: 124 MMHG | WEIGHT: 178 LBS | DIASTOLIC BLOOD PRESSURE: 75 MMHG

## 2019-09-23 PROCEDURE — 99214 OFFICE O/P EST MOD 30 MIN: CPT | Mod: 25

## 2019-09-23 PROCEDURE — 69210 REMOVE IMPACTED EAR WAX UNI: CPT

## 2019-09-23 NOTE — HISTORY OF PRESENT ILLNESS
[T plasty] : tympanoplasty [Mastoid] : mastoid surgery [Yes] : patient has a history radiation therapy [Tinnitus] : tinnitus [Hearing Loss] : hearing loss [Otalgia] : otalgia [Otorrhea] : otorrhea [Acoustic Neuroma] : acoustic neuroma [Cholesteatoma] : cholesteatoma [Ear Pain] : ear pain [None] : No risk factors have been identified. [Facial Pain] : no facial pain [de-identified] : 43 y/o male w/ significant history of of AVELINA, left acoustic neuroma and cholesteatoma. He has been treated with a left mastoidectomy for removal of cholesteatoma and radiation therapy for the acoustic neuroma. He is here for f/u for L ear drainage. His L ear is still continuing to drain on a daily basis w/ occasional pain in his L ear. His hearing is still diminished in his L ear. He has ringing in the L ear. He is using a hearing aide in the R ear which is helping him. He has no other ENT complaints. Pt has no vertigo, nasal congestion, nasal discharge, epistaxis, sinus infections, facial pain, facial pressure, throat pain, dysphagia or fevers\par He notes some improvement in left ear since last visit 9/11/19 after debridement and otic drops\par \par \par  [Facial Pressure] : no facial pressure [Clear Rhinorrhea] : no clear rhinorrhea [Purulent Rhinorrhea] : no purulent rhinorrhea [Nasal Congestion] : no nasal congestion [Postnasal Drainage] : no postnasal drainage

## 2019-09-23 NOTE — PHYSICAL EXAM
[] : septum deviated bilaterally [Midline] : trachea located in midline position [Binocular Microscopic Exam] : Binocular microscopic exam was performed [de-identified] : Fungal moist debris in L ear canal, R normal  [de-identified] : L Fungal OM and polyp, R normal  [Normal] : no rashes [Hearing Loss Right Only] : normal [de-identified] :  debris and cerumen  [Hearing Loss Left Only] : normal [de-identified] :  debris

## 2019-09-23 NOTE — REVIEW OF SYSTEMS
[Patient Intake Form Reviewed] : Patient intake form was reviewed [As Noted in HPI] : as noted in HPI [Ear Pain] : ear pain [Hearing Loss] : hearing loss [Ear Drainage] : ear drainage [Ear Noises] : ear noises [Negative] : Heme/Lymph

## 2019-09-23 NOTE — PROCEDURE
[Risk and Benefits Discussed] : The purpose, risks, discomforts, benefits and alternatives of the procedure have been explained to the patient including no treatment. [Cerumen Impaction] : Cerumen Impaction [Same] : same as the Pre Op Dx. [] : Removal of Cerumen [FreeTextEntry6] : the microscope was necessary for illumination and magnification\par mastoid debrided,wax removed w/ suction and currette\par post/sup aural polyp noted in middle ear space-removed w/ suction and alligator forceps\par otic drops placed\par removed and AgNO3 appiled [FreeTextEntry1] : left aural polyp chr drainage

## 2019-09-23 NOTE — ASSESSMENT
[FreeTextEntry1] : 43 y/o male w/ significant history of cholesteatoma and chronic mastoiditis in the left ear and left acoustic neuroma treated with radiation therapy, He continues to have L ear drainage and intermittent pain. He has L fungal mastoid/OE on exam that is improving. His L ear was debrided and irrigated. L polyp removed from ear canal.  \par \par L chr mastoiditis r/o cholesteatoma\par -bilateral sensorineural hearing loss-continue using hearing aide in R ear as it is helping him\par -Rx: TobraDex drops bid -left ear\par -h2o precautions\par Keep hearing aid out of Left ear \par MRI of Left ear-Propellor DWI technique to eval for cholesteatoma\par f/u 10-14 days\par \par \par Will f/u in 7 days

## 2019-09-23 NOTE — DATA REVIEWED
[No studies available for review at this time] : No studies available for review at this time [de-identified] : stable hearing bilaterally

## 2019-10-07 ENCOUNTER — APPOINTMENT (OUTPATIENT)
Dept: OTOLARYNGOLOGY | Facility: CLINIC | Age: 44
End: 2019-10-07
Payer: MEDICAID

## 2019-10-07 VITALS
HEIGHT: 62 IN | WEIGHT: 178 LBS | HEART RATE: 72 BPM | BODY MASS INDEX: 32.76 KG/M2 | SYSTOLIC BLOOD PRESSURE: 115 MMHG | DIASTOLIC BLOOD PRESSURE: 74 MMHG

## 2019-10-07 PROCEDURE — 99213 OFFICE O/P EST LOW 20 MIN: CPT

## 2019-10-07 NOTE — PROCEDURE
[Risk and Benefits Discussed] : The purpose, risks, discomforts, benefits and alternatives of the procedure have been explained to the patient including no treatment. [Cerumen Impaction] : Cerumen Impaction [Same] : same as the Pre Op Dx. [] : Removal of Cerumen [FreeTextEntry1] : left aural polyp chr drainage [FreeTextEntry6] : the microscope was necessary for illumination and magnification\par mastoid debrided,wax removed w/ suction and currette\par post/sup aural polyp noted in middle ear space-removed w/ suction and alligator forceps\par otic drops placed\par removed and AgNO3 appiled

## 2019-10-07 NOTE — PHYSICAL EXAM
[de-identified] : clean left attic [] : septum deviated bilaterally [Midline] : trachea located in midline position [Binocular Microscopic Exam] : Binocular microscopic exam was performed [Hearing Loss Right Only] : normal [Hearing Loss Left Only] : normal [de-identified] :  debris [de-identified] :  debris and cerumen  [Normal] : salivary glands are normal

## 2019-10-07 NOTE — ASSESSMENT
[FreeTextEntry1] : 45 y/o male w/ significant history of cholesteatoma and chronic mastoiditis in the left ear and left acoustic neuroma treated with radiation therapy, Now w/ min drainage\par \par L chr mastoiditis r/o cholesteatoma\par -bilateral sensorineural hearing loss-continue using hearing aide in R ear as it is helping him\par d/c TobraDex drops bid -left ear\par -h2o precautions\par Keep hearing aid out of Left ear \par MRI of Left ear-Propellor DWI technique to eval for cholesteatoma\par f/u 1 month\par \par \par Will f/u in 7 days

## 2019-10-07 NOTE — DATA REVIEWED
[No studies available for review at this time] : No studies available for review at this time [de-identified] : stable hearing bilaterally

## 2019-10-07 NOTE — HISTORY OF PRESENT ILLNESS
[T plasty] : tympanoplasty [Mastoid] : mastoid surgery [Yes] : patient has a history radiation therapy [de-identified] : very min left ear drainage now after tobradex drops tx\par 45 y/o male w/ significant history of of AVELINA, left acoustic neuroma and cholesteatoma. He has been treated with a left mastoidectomy for removal of cholesteatoma and radiation therapy for the acoustic neuroma. He is here for f/u for L ear drainage. His L ear is still continuing to drain on a daily basis w/ occasional pain in his L ear. His hearing is still diminished in his L ear. He has ringing in the L ear. He is using a hearing aide in the R ear which is helping him. He has no other ENT complaints. Pt has no vertigo, nasal congestion, nasal discharge, epistaxis, sinus infections, facial pain, facial pressure, throat pain, dysphagia or fevers\par He notes some improvement in left ear since last visit 9/11/19 after debridement and otic drops\par \par \par  [Tinnitus] : tinnitus [Hearing Loss] : hearing loss [Otalgia] : otalgia [Otorrhea] : otorrhea [Acoustic Neuroma] : acoustic neuroma [Cholesteatoma] : cholesteatoma [Facial Pain] : no facial pain [Clear Rhinorrhea] : no clear rhinorrhea [Facial Pressure] : no facial pressure [Purulent Rhinorrhea] : no purulent rhinorrhea [Nasal Congestion] : no nasal congestion [Postnasal Drainage] : no postnasal drainage [Ear Pain] : ear pain [None] : No risk factors have been identified.

## 2019-10-11 ENCOUNTER — APPOINTMENT (OUTPATIENT)
Dept: MRI IMAGING | Facility: CLINIC | Age: 44
End: 2019-10-11

## 2019-10-12 ENCOUNTER — OUTPATIENT (OUTPATIENT)
Dept: OUTPATIENT SERVICES | Facility: HOSPITAL | Age: 44
LOS: 1 days | End: 2019-10-12

## 2019-10-12 ENCOUNTER — APPOINTMENT (OUTPATIENT)
Dept: MRI IMAGING | Facility: CLINIC | Age: 44
End: 2019-10-12

## 2019-10-12 DIAGNOSIS — H71.22 CHOLESTEATOMA OF MASTOID, LEFT EAR: ICD-10-CM

## 2019-10-22 ENCOUNTER — FORM ENCOUNTER (OUTPATIENT)
Age: 44
End: 2019-10-22

## 2019-10-23 ENCOUNTER — OUTPATIENT (OUTPATIENT)
Dept: OUTPATIENT SERVICES | Facility: HOSPITAL | Age: 44
LOS: 1 days | End: 2019-10-23
Payer: MEDICAID

## 2019-10-23 ENCOUNTER — APPOINTMENT (OUTPATIENT)
Dept: MRI IMAGING | Facility: IMAGING CENTER | Age: 44
End: 2019-10-23
Payer: MEDICAID

## 2019-10-23 DIAGNOSIS — H71.22 CHOLESTEATOMA OF MASTOID, LEFT EAR: ICD-10-CM

## 2019-10-23 PROCEDURE — 70551 MRI BRAIN STEM W/O DYE: CPT

## 2019-10-23 PROCEDURE — 70551 MRI BRAIN STEM W/O DYE: CPT | Mod: 26

## 2019-11-07 ENCOUNTER — APPOINTMENT (OUTPATIENT)
Dept: OTOLARYNGOLOGY | Facility: CLINIC | Age: 44
End: 2019-11-07

## 2019-12-23 ENCOUNTER — APPOINTMENT (OUTPATIENT)
Dept: OTOLARYNGOLOGY | Facility: CLINIC | Age: 44
End: 2019-12-23
Payer: MEDICAID

## 2019-12-23 VITALS
DIASTOLIC BLOOD PRESSURE: 75 MMHG | WEIGHT: 178 LBS | HEIGHT: 62 IN | HEART RATE: 76 BPM | BODY MASS INDEX: 32.76 KG/M2 | SYSTOLIC BLOOD PRESSURE: 124 MMHG

## 2019-12-23 DIAGNOSIS — J34.2 DEVIATED NASAL SEPTUM: ICD-10-CM

## 2019-12-23 DIAGNOSIS — J31.0 CHRONIC RHINITIS: ICD-10-CM

## 2019-12-23 DIAGNOSIS — D33.3 BENIGN NEOPLASM OF CRANIAL NERVES: ICD-10-CM

## 2019-12-23 PROCEDURE — 92504 EAR MICROSCOPY EXAMINATION: CPT | Mod: 59

## 2019-12-23 PROCEDURE — 99214 OFFICE O/P EST MOD 30 MIN: CPT | Mod: 25

## 2019-12-23 PROCEDURE — 92557 COMPREHENSIVE HEARING TEST: CPT

## 2019-12-23 PROCEDURE — 69220 CLEAN OUT MASTOID CAVITY: CPT | Mod: RT

## 2019-12-23 PROCEDURE — 69540 EXCISION AURAL POLYP: CPT | Mod: LT

## 2019-12-23 PROCEDURE — 92567 TYMPANOMETRY: CPT

## 2019-12-23 NOTE — PHYSICAL EXAM
[] : septum deviated bilaterally [Midline] : trachea located in midline position [Binocular Microscopic Exam] : Binocular microscopic exam was performed [de-identified] : clean left attic [Normal] : no rashes [Hearing Loss Right Only] : normal [Hearing Loss Left Only] : normal [FreeTextEntry8] : debris in medial/post retraction pocket [FreeTextEntry9] : scutum polyp na dcholesteatoma extending post /sup into aditus [de-identified] : retracted [de-identified] :  debris [de-identified] :  debris and cerumen

## 2019-12-23 NOTE — HISTORY OF PRESENT ILLNESS
[T plasty] : tympanoplasty [Mastoid] : mastoid surgery [Yes] : patient has a history radiation therapy [Tinnitus] : tinnitus [Hearing Loss] : hearing loss [Otalgia] : otalgia [Otorrhea] : otorrhea [Acoustic Neuroma] : acoustic neuroma [Cholesteatoma] : cholesteatoma [Ear Pain] : ear pain [None] : No risk factors have been identified. [de-identified] : \par 45 y/o male w/ significant history of of AVELINA, left acoustic neuroma and cholesteatoma. He has been treated with a right mastoidectomy for removal of cholesteatoma and radiation therapy for the acoustic neuroma. He is here for f/u for L ear drainage. His L ear is still continuing to drain on a daily basis w/ occasional pain in his L ear. His hearing is still diminished in his L ear. He has ringing in the L ear. He is using a hearing aide in the R ear which is helping him. He has no other ENT complaints. Pt has no vertigo, nasal congestion, nasal discharge, epistaxis, sinus infections, facial pain, facial pressure, throat pain, dysphagia or fevers\par He notes some improvement in left ear since last visit 9/11/19 after debridement and otic drops\par Recent MRI c/w cholesteatoma in left ear\par \par \par  [Facial Pain] : no facial pain [Clear Rhinorrhea] : no clear rhinorrhea [Facial Pressure] : no facial pressure [Purulent Rhinorrhea] : no purulent rhinorrhea [Nasal Congestion] : no nasal congestion [Postnasal Drainage] : no postnasal drainage

## 2019-12-23 NOTE — DATA REVIEWED
[No studies available for review at this time] : No studies available for review at this time [de-identified] : bilat hearing loss -stable\par Hearing Test performed to evaluate the extent of hearing loss and asymmetry to explain pt's symptoms\par

## 2019-12-23 NOTE — ASSESSMENT
[FreeTextEntry1] : 45 y/o male w/ significant history of cholesteatoma and chronic mastoiditis in the left ear and left acoustic neuroma treated with radiation therapy, Now w/  drainage w/MRI c/w cholesteatoma\par \par Rec h2o precautions\par Left Tympanomastoidectomy and Right ear mastoid debridement\par \par h/o AVELINA w/ CPAP and will therefore need to have surgery in Main OR at [facility]

## 2020-01-08 ENCOUNTER — APPOINTMENT (OUTPATIENT)
Dept: OTOLARYNGOLOGY | Facility: CLINIC | Age: 45
End: 2020-01-08
Payer: MEDICAID

## 2020-01-08 VITALS
HEIGHT: 62 IN | HEART RATE: 76 BPM | DIASTOLIC BLOOD PRESSURE: 73 MMHG | WEIGHT: 178 LBS | BODY MASS INDEX: 32.76 KG/M2 | SYSTOLIC BLOOD PRESSURE: 118 MMHG

## 2020-01-08 DIAGNOSIS — H90.3 SENSORINEURAL HEARING LOSS, BILATERAL: ICD-10-CM

## 2020-01-08 DIAGNOSIS — H69.83 OTHER SPECIFIED DISORDERS OF EUSTACHIAN TUBE, BILATERAL: ICD-10-CM

## 2020-01-08 DIAGNOSIS — H72.02 CENTRAL PERFORATION OF TYMPANIC MEMBRANE, LEFT EAR: ICD-10-CM

## 2020-01-08 DIAGNOSIS — H74.42 POLYP OF LEFT MIDDLE EAR: ICD-10-CM

## 2020-01-08 PROCEDURE — 92504 EAR MICROSCOPY EXAMINATION: CPT | Mod: 59

## 2020-01-08 PROCEDURE — 99214 OFFICE O/P EST MOD 30 MIN: CPT | Mod: 25

## 2020-01-08 PROCEDURE — 69540 EXCISION AURAL POLYP: CPT | Mod: LT

## 2020-01-08 NOTE — PHYSICAL EXAM
[] : septum deviated bilaterally [de-identified] : clean left attic [Midline] : trachea located in midline position [Binocular Microscopic Exam] : Binocular microscopic exam was performed [Normal] : the right mastoid was normal [Hearing Loss Right Only] : normal [Hearing Loss Left Only] : normal [FreeTextEntry8] : debris in medial/post retraction pocket [FreeTextEntry9] : scutum polyp na dcholesteatoma extending post /sup into aditus [de-identified] :  debris [de-identified] : retracted [de-identified] :  debris and cerumen

## 2020-01-08 NOTE — PROCEDURE
[Risk and Benefits Discussed] : The purpose, risks, discomforts, benefits and alternatives of the procedure have been explained to the patient including no treatment. [Cerumen Impaction] : Cerumen Impaction [Same] : same as the Pre Op Dx. [] : Removal of Aural Polyp [FreeTextEntry1] : left aural polyp chr drainage [FreeTextEntry6] : the microscope was necessary for illumination and magnification\par mastoid debrided,wax removed w/ suction and currette\par post/sup aural polyp noted in middle ear space-removed w/ suction and alligator forceps\par Cholesteatoma ball also removed\par otic drops placed

## 2020-01-08 NOTE — HISTORY OF PRESENT ILLNESS
[T plasty] : tympanoplasty [Mastoid] : mastoid surgery [Yes] : patient has a history radiation therapy [de-identified] : 45 y/o male w/ significant history of of AVELINA, left acoustic neuroma and cholesteatoma. He has been treated with a right mastoidectomy for removal of cholesteatoma and radiation therapy for the acoustic neuroma. He is here for f/u for L ear drainage. His L ear is still continuing to drain on a daily basis w/ occasional pain in his L ear. His hearing is still diminished in his L ear. He has ringing in the L ear. He is using a hearing aide in the R ear which is helping him. He has no other ENT complaints. Pt has no vertigo, nasal congestion, nasal discharge, epistaxis, sinus infections, facial pain, facial pressure, throat pain, dysphagia or fevers\par He notes some improvement in left ear since last visit 9/11/19 after debridement and otic drops\par Recent MRI c/w cholesteatoma in left ear\par \par \par  [Tinnitus] : tinnitus [Hearing Loss] : hearing loss [Otalgia] : otalgia [Otorrhea] : otorrhea [Acoustic Neuroma] : acoustic neuroma [Cholesteatoma] : cholesteatoma [Facial Pain] : no facial pain [Clear Rhinorrhea] : no clear rhinorrhea [Facial Pressure] : no facial pressure [Purulent Rhinorrhea] : no purulent rhinorrhea [Nasal Congestion] : no nasal congestion [Ear Pain] : ear pain [Postnasal Drainage] : no postnasal drainage [None] : No risk factors have been identified.

## 2020-01-08 NOTE — REVIEW OF SYSTEMS
[Patient Intake Form Reviewed] : Patient intake form was reviewed [Ear Pain] : ear pain [As Noted in HPI] : as noted in HPI [Hearing Loss] : hearing loss [Ear Drainage] : ear drainage [Ear Noises] : ear noises [Negative] : Heme/Lymph

## 2020-01-08 NOTE — ASSESSMENT
[FreeTextEntry1] : 45 y/o male w/ significant history of cholesteatoma and chronic mastoiditis in the left ear and left acoustic neuroma treated with radiation therapy, Now w/  drainage w/MRI c/w cholesteatoma\par \par Rec h2o precautions\par rec-Left Tympanomastoidectomy and Right ear mastoid debridement\par f/u pre-op to get ear as dry as poss pre-op\par \par h/o AVELINA w/ CPAP and will therefore need to have surgery in Main OR at [facility]

## 2020-01-08 NOTE — REASON FOR VISIT
[Subsequent Evaluation] : a subsequent evaluation for [Nasal Obstruction] : nasal obstruction [Hearing Loss] : hearing loss [FreeTextEntry2] : L ear drainage

## 2020-01-08 NOTE — DATA REVIEWED
[No studies available for review at this time] : No studies available for review at this time [de-identified] : bilat hearing loss -stable\par Hearing Test performed to evaluate the extent of hearing loss and asymmetry to explain pt's symptoms\par

## 2020-02-17 ENCOUNTER — APPOINTMENT (OUTPATIENT)
Dept: OTOLARYNGOLOGY | Facility: CLINIC | Age: 45
End: 2020-02-17

## 2020-03-02 ENCOUNTER — OUTPATIENT (OUTPATIENT)
Dept: OUTPATIENT SERVICES | Facility: HOSPITAL | Age: 45
LOS: 1 days | End: 2020-03-02
Payer: MEDICAID

## 2020-03-02 VITALS
SYSTOLIC BLOOD PRESSURE: 106 MMHG | HEIGHT: 60.5 IN | TEMPERATURE: 99 F | HEART RATE: 88 BPM | OXYGEN SATURATION: 96 % | RESPIRATION RATE: 16 BRPM | DIASTOLIC BLOOD PRESSURE: 80 MMHG | WEIGHT: 171.96 LBS

## 2020-03-02 DIAGNOSIS — H70.11 CHRONIC MASTOIDITIS, RIGHT EAR: ICD-10-CM

## 2020-03-02 DIAGNOSIS — D36.10 BENIGN NEOPLASM OF PERIPHERAL NERVES AND AUTONOMIC NERVOUS SYSTEM, UNSPECIFIED: Chronic | ICD-10-CM

## 2020-03-02 DIAGNOSIS — Z98.890 OTHER SPECIFIED POSTPROCEDURAL STATES: Chronic | ICD-10-CM

## 2020-03-02 LAB
ALBUMIN SERPL ELPH-MCNC: 4.4 G/DL — SIGNIFICANT CHANGE UP (ref 3.3–5)
ALP SERPL-CCNC: 82 U/L — SIGNIFICANT CHANGE UP (ref 40–120)
ALT FLD-CCNC: 61 U/L — HIGH (ref 4–41)
ANION GAP SERPL CALC-SCNC: 12 MMO/L — SIGNIFICANT CHANGE UP (ref 7–14)
AST SERPL-CCNC: 36 U/L — SIGNIFICANT CHANGE UP (ref 4–40)
BILIRUB SERPL-MCNC: 0.5 MG/DL — SIGNIFICANT CHANGE UP (ref 0.2–1.2)
BUN SERPL-MCNC: 9 MG/DL — SIGNIFICANT CHANGE UP (ref 7–23)
CALCIUM SERPL-MCNC: 10 MG/DL — SIGNIFICANT CHANGE UP (ref 8.4–10.5)
CHLORIDE SERPL-SCNC: 103 MMOL/L — SIGNIFICANT CHANGE UP (ref 98–107)
CO2 SERPL-SCNC: 25 MMOL/L — SIGNIFICANT CHANGE UP (ref 22–31)
CREAT SERPL-MCNC: 0.76 MG/DL — SIGNIFICANT CHANGE UP (ref 0.5–1.3)
GLUCOSE SERPL-MCNC: 106 MG/DL — HIGH (ref 70–99)
HBA1C BLD-MCNC: 5.5 % — SIGNIFICANT CHANGE UP (ref 4–5.6)
HCT VFR BLD CALC: 46.5 % — SIGNIFICANT CHANGE UP (ref 39–50)
HGB BLD-MCNC: 15.6 G/DL — SIGNIFICANT CHANGE UP (ref 13–17)
MCHC RBC-ENTMCNC: 33.5 % — SIGNIFICANT CHANGE UP (ref 32–36)
MCHC RBC-ENTMCNC: 33.5 PG — SIGNIFICANT CHANGE UP (ref 27–34)
MCV RBC AUTO: 100 FL — SIGNIFICANT CHANGE UP (ref 80–100)
NRBC # FLD: 0 K/UL — SIGNIFICANT CHANGE UP (ref 0–0)
PLATELET # BLD AUTO: 270 K/UL — SIGNIFICANT CHANGE UP (ref 150–400)
PMV BLD: 11.4 FL — SIGNIFICANT CHANGE UP (ref 7–13)
POTASSIUM SERPL-MCNC: 4 MMOL/L — SIGNIFICANT CHANGE UP (ref 3.5–5.3)
POTASSIUM SERPL-SCNC: 4 MMOL/L — SIGNIFICANT CHANGE UP (ref 3.5–5.3)
PROT SERPL-MCNC: 7.9 G/DL — SIGNIFICANT CHANGE UP (ref 6–8.3)
RBC # BLD: 4.65 M/UL — SIGNIFICANT CHANGE UP (ref 4.2–5.8)
RBC # FLD: 13.4 % — SIGNIFICANT CHANGE UP (ref 10.3–14.5)
SODIUM SERPL-SCNC: 140 MMOL/L — SIGNIFICANT CHANGE UP (ref 135–145)
WBC # BLD: 8.29 K/UL — SIGNIFICANT CHANGE UP (ref 3.8–10.5)
WBC # FLD AUTO: 8.29 K/UL — SIGNIFICANT CHANGE UP (ref 3.8–10.5)

## 2020-03-02 PROCEDURE — 93010 ELECTROCARDIOGRAM REPORT: CPT

## 2020-03-02 RX ORDER — SODIUM CHLORIDE 9 MG/ML
1000 INJECTION, SOLUTION INTRAVENOUS
Refills: 0 | Status: DISCONTINUED | OUTPATIENT
Start: 2020-03-10 | End: 2020-03-25

## 2020-03-02 NOTE — H&P PST ADULT - ASSESSMENT
DX: DX: chronic mastoiditis, right ear, cholesteatoma of mastoid, left ear and evaluated for a left tympanomastoidectomy, left temporalis fascia graft right mastoid debridement on 3/10/2010.     Plan: preop instructions provided including npo status Pepcid C/W meds as ordered omeprazole, famotidine (may take in am dos), cont Vit D3 and gabapentin as ordered. Metformin aware of last dose to be taken on 3/9/2020 am, no pm doses and none on 3/10/2020. Phentamine and Topamax last doses 3 days prior to sx. BW sent and awaiting results, MC pending form provided.

## 2020-03-02 NOTE — H&P PST ADULT - NEGATIVE CARDIOVASCULAR SYMPTOMS
no orthopnea/no dyspnea on exertion/no peripheral edema/no palpitations/no claudication/no paroxysmal nocturnal dyspnea/no chest pain

## 2020-03-02 NOTE — H&P PST ADULT - NSICDXPASTSURGICALHX_GEN_ALL_CORE_FT
PAST SURGICAL HISTORY:  H/O laminectomy 2015    H/O neck surgery 2017    H/O shoulder surgery left 2015    S/P appendectomy     Schwannoma

## 2020-03-02 NOTE — H&P PST ADULT - NSANTHOSAYNRD_GEN_A_CORE
No. AVELINA screening performed.  STOP BANG Legend: 0-2 = LOW Risk; 3-4 = INTERMEDIATE Risk; 5-8 = HIGH Risk/never tested

## 2020-03-02 NOTE — H&P PST ADULT - HISTORY OF PRESENT ILLNESS
45 y/o Romansh speaking male presents to PST w/ a preop dx of chronic mastoiditis, right ear, cholesteatoma of mastoid, left ear and to be evaluated for a left tympanomastoidectomy, left temporalis fascia graft right mastoid debridement on 3/10/2010.   Pt states "I have an infection on my left ear treated but symptoms persist. B/L Hearing loss Lt> Rt, uses rt h/a and left not in use due to infection. Pt states re evaluated and recommended surgical intervention at this time. 45 y/o Sinhala speaking male presents to PST w/ a preop dx of chronic mastoiditis, right ear, cholesteatoma of mastoid, left ear and to be evaluated for a left tympanomastoidectomy, left temporalis fascia graft right mastoid debridement on 3/10/2010.   Pt states "I have an infection on my left ear treated but symptoms persist. B/L Hearing loss Lt> Rt also stated, uses rt h/a and left not in use due to infection. Pt states re evaluated and recommended surgical intervention at this time.

## 2020-03-02 NOTE — H&P PST ADULT - RS GEN PE MLT RESP DETAILS PC
no wheezes/clear to auscultation bilaterally/good air movement/no chest wall tenderness/airway patent/no rhonchi/no intercostal retractions/respirations non-labored/no subcutaneous emphysema/no rales/breath sounds equal

## 2020-03-02 NOTE — H&P PST ADULT - NEGATIVE ENMT SYMPTOMS
no nose bleeds/no nasal obstruction/no recurrent cold sores/no abnormal taste sensation/no sinus symptoms/no nasal congestion/no nasal discharge/no post-nasal discharge/no gum bleeding/no throat pain/no dysphagia

## 2020-03-02 NOTE — H&P PST ADULT - NS MD HP INPLANTS MED DEV
screwa to left shoulder, possible hardware to lumbar spine/Hearing aid Hearing aid/screws to left shoulder, possible hardware to lumbar spine

## 2020-03-02 NOTE — H&P PST ADULT - NEGATIVE PSYCHIATRIC SYMPTOMS
no depression/no auditory hallucinations/no paranoia/no memory loss/no visual hallucinations/no hyperactivity/no mood swings/no agitation/no suicidal ideation/no anxiety

## 2020-03-02 NOTE — H&P PST ADULT - NEGATIVE NEUROLOGICAL SYMPTOMS
no tremors/no confusion/no transient paralysis/no weakness/no generalized seizures/no loss of sensation/no headache/no focal seizures/no hemiparesis/no facial palsy/no difficulty walking/no loss of consciousness/no syncope/no vertigo

## 2020-03-04 ENCOUNTER — APPOINTMENT (OUTPATIENT)
Dept: OTOLARYNGOLOGY | Facility: CLINIC | Age: 45
End: 2020-03-04
Payer: MEDICAID

## 2020-03-04 VITALS
WEIGHT: 178 LBS | SYSTOLIC BLOOD PRESSURE: 113 MMHG | BODY MASS INDEX: 32.76 KG/M2 | HEIGHT: 62 IN | DIASTOLIC BLOOD PRESSURE: 72 MMHG | HEART RATE: 85 BPM

## 2020-03-04 DIAGNOSIS — G47.33 OBSTRUCTIVE SLEEP APNEA (ADULT) (PEDIATRIC): ICD-10-CM

## 2020-03-04 DIAGNOSIS — H61.23 IMPACTED CERUMEN, BILATERAL: ICD-10-CM

## 2020-03-04 DIAGNOSIS — D33.3 BENIGN NEOPLASM OF CRANIAL NERVES: ICD-10-CM

## 2020-03-04 DIAGNOSIS — Z99.89 OBSTRUCTIVE SLEEP APNEA (ADULT) (PEDIATRIC): ICD-10-CM

## 2020-03-04 PROBLEM — K21.9 GASTRO-ESOPHAGEAL REFLUX DISEASE WITHOUT ESOPHAGITIS: Chronic | Status: ACTIVE | Noted: 2020-03-02

## 2020-03-04 PROBLEM — E11.9 TYPE 2 DIABETES MELLITUS WITHOUT COMPLICATIONS: Chronic | Status: ACTIVE | Noted: 2020-03-02

## 2020-03-04 PROBLEM — G47.00 INSOMNIA, UNSPECIFIED: Chronic | Status: ACTIVE | Noted: 2020-03-02

## 2020-03-04 PROBLEM — E66.9 OBESITY, UNSPECIFIED: Chronic | Status: ACTIVE | Noted: 2020-03-02

## 2020-03-04 PROCEDURE — 99214 OFFICE O/P EST MOD 30 MIN: CPT | Mod: 25,57

## 2020-03-04 PROCEDURE — 69220 CLEAN OUT MASTOID CAVITY: CPT | Mod: 50

## 2020-03-04 PROCEDURE — 92504 EAR MICROSCOPY EXAMINATION: CPT | Mod: 59

## 2020-03-04 NOTE — PHYSICAL EXAM
[] : septum deviated bilaterally [Midline] : trachea located in midline position [Normal] : the right mastoid was normal [de-identified] : clean left attic [Hearing Loss Right Only] : normal [Hearing Loss Left Only] : normal [FreeTextEntry8] : debris in medial/post retraction pocket [FreeTextEntry9] : scutum polyp and cholesteatoma extending post /sup into aditus [de-identified] : retracted [de-identified] :  debris [de-identified] :  debris and cerumen

## 2020-03-04 NOTE — ASSESSMENT
[FreeTextEntry1] : 43 y/o male w/ significant history of cholesteatoma and chronic mastoiditis in the left ear and left acoustic neuroma treated with radiation therapy, Now w/  drainage w/MRI c/w cholesteatoma\par \par Rec h2o precautions\par Both ears cleaned, will proceed with surgery next week. \par will proceed with Left Tympanomastoidectomy and Right ear mastoid debridement \par \par \par h/o AVELINA w/ CPAP and will therefore need to have surgery in Main OR at [facility]

## 2020-03-04 NOTE — DATA REVIEWED
[No studies available for review at this time] : No studies available for review at this time [de-identified] : bilat hearing loss -stable\par Hearing Test performed to evaluate the extent of hearing loss and asymmetry to explain pt's symptoms\par

## 2020-03-04 NOTE — HISTORY OF PRESENT ILLNESS
[T plasty] : tympanoplasty [Mastoid] : mastoid surgery [Yes] : patient has a history radiation therapy [Tinnitus] : tinnitus [Hearing Loss] : hearing loss [Otalgia] : otalgia [Otorrhea] : otorrhea [Acoustic Neuroma] : acoustic neuroma [Cholesteatoma] : cholesteatoma [Ear Pain] : ear pain [None] : No risk factors have been identified. [de-identified] : 45 y/o male w/ significant history of of AVELINA, left acoustic neuroma and cholesteatoma. He has been treated with a right mastoidectomy for removal of cholesteatoma and radiation therapy for the acoustic neuroma. He is here for f/u for L ear drainage. His L ear is still continuing to drain on a daily basis w/ occasional pain in his L ear. His hearing is still diminished in his L ear. He has ringing in the L ear. He is using a hearing aide in the R ear which is helping him. He has no other ENT complaints. Pt has no vertigo, nasal congestion, nasal discharge, epistaxis, sinus infections, facial pain, facial pressure, throat pain, dysphagia or fevers\par He notes some improvement in left ear since last visit 9/11/19 after debridement and otic drops\par Recent MRI c/w cholesteatoma in left ear [Facial Pain] : no facial pain [Clear Rhinorrhea] : no clear rhinorrhea [Facial Pressure] : no facial pressure [Purulent Rhinorrhea] : no purulent rhinorrhea [Nasal Congestion] : no nasal congestion [Postnasal Drainage] : no postnasal drainage

## 2020-03-04 NOTE — PROCEDURE
[Same] : same as the Pre Op Dx. [] : Removal of Cerumen [Risk and Benefits Discussed] : The purpose, risks, discomforts, benefits and alternatives of the procedure have been explained to the patient including no treatment. [Cerumen Impaction] : Cerumen Impaction [FreeTextEntry1] : left ear cholesteatoma [FreeTextEntry6] : Left /mastoidattic polyp and cholesteatoma-removed\par wax removed\par the microscope was necessary for illumination and magnification\par proceed w/ proposed yonatan

## 2020-03-09 ENCOUNTER — TRANSCRIPTION ENCOUNTER (OUTPATIENT)
Age: 45
End: 2020-03-09

## 2020-03-09 NOTE — ASU PATIENT PROFILE, ADULT - PSH
H/O laminectomy  2015  H/O neck surgery  2017  H/O shoulder surgery  left 2015  S/P appendectomy    Schwannoma

## 2020-03-10 ENCOUNTER — OUTPATIENT (OUTPATIENT)
Dept: OUTPATIENT SERVICES | Facility: HOSPITAL | Age: 45
LOS: 1 days | Discharge: HOME | End: 2020-03-10
Payer: MEDICAID

## 2020-03-10 ENCOUNTER — APPOINTMENT (OUTPATIENT)
Dept: OTOLARYNGOLOGY | Facility: HOSPITAL | Age: 45
End: 2020-03-10

## 2020-03-10 ENCOUNTER — RESULT REVIEW (OUTPATIENT)
Age: 45
End: 2020-03-10

## 2020-03-10 VITALS
SYSTOLIC BLOOD PRESSURE: 118 MMHG | RESPIRATION RATE: 14 BRPM | TEMPERATURE: 98 F | HEIGHT: 60.5 IN | WEIGHT: 171.96 LBS | OXYGEN SATURATION: 96 % | DIASTOLIC BLOOD PRESSURE: 77 MMHG | HEART RATE: 67 BPM

## 2020-03-10 VITALS
SYSTOLIC BLOOD PRESSURE: 108 MMHG | HEART RATE: 100 BPM | DIASTOLIC BLOOD PRESSURE: 56 MMHG | RESPIRATION RATE: 18 BRPM | OXYGEN SATURATION: 98 %

## 2020-03-10 DIAGNOSIS — Z98.890 OTHER SPECIFIED POSTPROCEDURAL STATES: Chronic | ICD-10-CM

## 2020-03-10 DIAGNOSIS — H70.11 CHRONIC MASTOIDITIS, RIGHT EAR: ICD-10-CM

## 2020-03-10 DIAGNOSIS — D36.10 BENIGN NEOPLASM OF PERIPHERAL NERVES AND AUTONOMIC NERVOUS SYSTEM, UNSPECIFIED: Chronic | ICD-10-CM

## 2020-03-10 LAB — GLUCOSE BLDC GLUCOMTR-MCNC: 93 MG/DL — SIGNIFICANT CHANGE UP (ref 70–99)

## 2020-03-10 PROCEDURE — 15769 GRFG AUTOL SOFT TISS DIR EXC: CPT | Mod: 59

## 2020-03-10 PROCEDURE — 88304 TISSUE EXAM BY PATHOLOGIST: CPT | Mod: 26

## 2020-03-10 PROCEDURE — 69222 CLEAN OUT MASTOID CAVITY: CPT | Mod: RT,59

## 2020-03-10 PROCEDURE — 88311 DECALCIFY TISSUE: CPT | Mod: 26

## 2020-03-10 PROCEDURE — 69645 REVISE MIDDLE EAR & MASTOID: CPT | Mod: LT

## 2020-03-10 PROCEDURE — 92504 EAR MICROSCOPY EXAMINATION: CPT | Mod: 59,RT

## 2020-03-10 PROCEDURE — 92516 FACIAL NERVE FUNCTION TEST: CPT | Mod: LT,59

## 2020-03-10 RX ORDER — FENTANYL CITRATE 50 UG/ML
50 INJECTION INTRAVENOUS
Refills: 0 | Status: DISCONTINUED | OUTPATIENT
Start: 2020-03-10 | End: 2020-03-10

## 2020-03-10 RX ORDER — LANOLIN ALCOHOL/MO/W.PET/CERES
1 CREAM (GRAM) TOPICAL
Qty: 0 | Refills: 0 | DISCHARGE

## 2020-03-10 RX ORDER — METFORMIN HYDROCHLORIDE 850 MG/1
1 TABLET ORAL
Qty: 0 | Refills: 0 | DISCHARGE

## 2020-03-10 RX ORDER — FENTANYL CITRATE 50 UG/ML
25 INJECTION INTRAVENOUS
Refills: 0 | Status: DISCONTINUED | OUTPATIENT
Start: 2020-03-10 | End: 2020-03-10

## 2020-03-10 RX ORDER — ONDANSETRON 8 MG/1
4 TABLET, FILM COATED ORAL ONCE
Refills: 0 | Status: DISCONTINUED | OUTPATIENT
Start: 2020-03-10 | End: 2020-03-25

## 2020-03-10 RX ORDER — TOPIRAMATE 25 MG
1 TABLET ORAL
Qty: 0 | Refills: 0 | DISCHARGE

## 2020-03-10 RX ORDER — SODIUM CHLORIDE 9 MG/ML
1000 INJECTION, SOLUTION INTRAVENOUS
Refills: 0 | Status: DISCONTINUED | OUTPATIENT
Start: 2020-03-10 | End: 2020-03-25

## 2020-03-10 RX ORDER — CHOLECALCIFEROL (VITAMIN D3) 125 MCG
1 CAPSULE ORAL
Qty: 0 | Refills: 0 | DISCHARGE

## 2020-03-10 RX ORDER — FAMOTIDINE 10 MG/ML
1 INJECTION INTRAVENOUS
Qty: 0 | Refills: 0 | DISCHARGE

## 2020-03-10 RX ORDER — PHENTERMINE HCL 30 MG
1 CAPSULE ORAL
Qty: 0 | Refills: 0 | DISCHARGE

## 2020-03-10 RX ORDER — ACETAMINOPHEN 500 MG
1000 TABLET ORAL ONCE
Refills: 0 | Status: COMPLETED | OUTPATIENT
Start: 2020-03-10 | End: 2020-03-10

## 2020-03-10 RX ORDER — OMEPRAZOLE 10 MG/1
1 CAPSULE, DELAYED RELEASE ORAL
Qty: 0 | Refills: 0 | DISCHARGE

## 2020-03-10 RX ORDER — GABAPENTIN 400 MG/1
1 CAPSULE ORAL
Qty: 0 | Refills: 0 | DISCHARGE

## 2020-03-10 RX ADMIN — FENTANYL CITRATE 50 MICROGRAM(S): 50 INJECTION INTRAVENOUS at 13:30

## 2020-03-10 RX ADMIN — FENTANYL CITRATE 50 MICROGRAM(S): 50 INJECTION INTRAVENOUS at 14:38

## 2020-03-10 RX ADMIN — Medication 1000 MILLIGRAM(S): at 14:38

## 2020-03-10 RX ADMIN — Medication 400 MILLIGRAM(S): at 13:31

## 2020-03-10 NOTE — ASU DISCHARGE PLAN (ADULT/PEDIATRIC) - CALL YOUR DOCTOR IF YOU HAVE ANY OF THE FOLLOWING:
Bleeding that does not stop Wound/Surgical Site with redness, or foul smelling discharge or pus/Nausea and vomiting that does not stop/Pain not relieved by Medications/Bleeding that does not stop/Fever greater than (need to indicate Fahrenheit or Celsius)/Inability to tolerate liquids or foods

## 2020-03-10 NOTE — BRIEF OPERATIVE NOTE - NSICDXBRIEFPOSTOP_GEN_ALL_CORE_FT
POST-OP DIAGNOSIS:  Chronic right mastoiditis 10-Mar-2020 13:18:56  Luis Kunz  Cholesteatoma of left mastoid 10-Mar-2020 13:18:46  Luis Kunz

## 2020-03-10 NOTE — ASU DISCHARGE PLAN (ADULT/PEDIATRIC) - NURSING INSTRUCTIONS
DO NOT take any Tylenol (Acetaminophen) or narcotics containing Tylenol until after  __7:30pm____ . You received Tylenol during your operation and it can cause damage to your liver if too much is taken within a 24 hour time period. DO NOT take any NSAID's (Ibuprofen, Aleve, Motrin, Advil until after  __6:15pm. Contact the MD for any of the following symptoms:  pain not relieved by medications, fever 100.4 or greater, bleeding, redness, excessive swelling and/or warmth or drainage from the surgical site, inability to tolerate food and/or drink. Any shortness of breath and/or chest discomfort return to the ER.

## 2020-03-10 NOTE — ASU DISCHARGE PLAN (ADULT/PEDIATRIC) - CARE PROVIDER_API CALL
Luis Kunz)  Otolaryngology  [address] [place] [address], [address]  [place] [address]  Phone: [phone]  Fax: [phone]  Follow Up Time:

## 2020-03-10 NOTE — BRIEF OPERATIVE NOTE - NSICDXBRIEFPREOP_GEN_ALL_CORE_FT
PRE-OP DIAGNOSIS:  Chronic mastoiditis of right side 10-Mar-2020 13:18:22  Luis Kunz  Cholesteatoma of mastoid, left ear 10-Mar-2020 13:18:12  Luis Kunz

## 2020-03-10 NOTE — BRIEF OPERATIVE NOTE - NSICDXBRIEFPROCEDURE_GEN_ALL_CORE_FT
PROCEDURES:  Complex debridement, mastoidectomy cavity 10-Mar-2020 13:17:48  Luis Kunz  Left tympanomastoidectomy 10-Mar-2020 13:17:29  Luis Kunz

## 2020-03-11 ENCOUNTER — APPOINTMENT (OUTPATIENT)
Dept: OTOLARYNGOLOGY | Facility: CLINIC | Age: 45
End: 2020-03-11
Payer: MEDICAID

## 2020-03-11 VITALS
BODY MASS INDEX: 32.76 KG/M2 | DIASTOLIC BLOOD PRESSURE: 69 MMHG | WEIGHT: 178 LBS | HEIGHT: 62 IN | SYSTOLIC BLOOD PRESSURE: 116 MMHG | HEART RATE: 83 BPM

## 2020-03-11 PROCEDURE — 99024 POSTOP FOLLOW-UP VISIT: CPT

## 2020-03-11 RX ORDER — NEOMYCIN AND POLYMYXIN B SULFATES AND HYDROCORTISONE OTIC 10; 3.5; 1 MG/ML; MG/ML; [USP'U]/ML
3.5-10000-1 SUSPENSION AURICULAR (OTIC)
Qty: 1 | Refills: 0 | Status: ACTIVE | COMMUNITY
Start: 2019-08-08 | End: 1900-01-01

## 2020-03-11 NOTE — REASON FOR VISIT
[Post-Operative Visit] : a post-operative visit [Hearing Loss] : hearing loss [Family Member] : family member

## 2020-03-11 NOTE — PHYSICAL EXAM
[Midline] : trachea located in midline position [Normal] : no rashes [de-identified] : dressing  removed  [de-identified] : right wnl  left not visualized-packing in place

## 2020-03-11 NOTE — HISTORY OF PRESENT ILLNESS
[Hearing Loss] : hearing loss [Presbycusis] : presbycusis [Prior Ear Surgery] : prior ear surgery [Cholesteatoma] : cholesteatoma [de-identified] : 43 yo male\par Patient presents s/p  Left  tympanomastoidectomy and right mastoid debridement 3/10/2020 \par States he has minor ear pain, taking Tylenol for pain. Otherwise feeling okay.\par no other modifying factors\par \par  [Vertigo] : no vertigo [Anxiety] : no anxiety [Dizziness] : no dizziness [Headache] : no headache [Recurrent Otitis Media] : no recurrent otitis media [Otitis Media with Effusion] : no otitis media with effusion [Congenital Ear Malformation] : no congenital ear malformation [Meniere Disease] : no Meniere disease [Otosclerosis] : no otosclerosis [Perilymphatic Fistula] : no perilymphatic fistula [Loud Noise Exposure] : no history of loud noise exposure [Smoking] : no smoking [Early Onset Hearing Loss] : no early onset hearing loss [Stroke] : no stroke [Facial Pain] : no facial pain [Facial Pressure] : no facial pressure [Nasal Congestion] : no nasal congestion [Allergic Rhinitis] : no allergic rhinitis [Maxillary Tooth Infection] : no maxillary tooth infection [Septal Deviation] : no septal deviation [Environmental Allergies] : no environmental allergies [Seasonal Allergies] : no seasonal allergies [Environmental Allergens] : no environmental allergens [Adenoidectomy] : no adenoidectomy [Nasal FB Removal] : no nasal foreign body removal [Allergies] : no allergies [Asthma] : no asthma [Neck Mass] : no neck mass [Neck Pain] : no neck pain [Chills] : no chills [Cold Intolerance] : no cold intolerance [Cough] : no cough [Fatigue] : no fatigue [Heat Intolerance] : no heat intolerance [Hyperthyroidism] : no hyperthyroidism [Sialadenitis] : no sialadenitis [Hodgkin Disease] : no hodgkin disease [Non-Hodgkin Lymphoma] : no non-hodgkin lymphoma [Tobacco Use] : no tobacco use [Alcohol Use] : no alcohol use [Graves Disease] : no graves disease [Thyroid Cancer] : no thyroid cancer

## 2020-03-11 NOTE — ASSESSMENT
[FreeTextEntry1] : Patient presents s/p Left  tympanomastoidectomy CWD- 3/10/2020\par feeling well \par rx: neomycin drops \par dressing removed \par \par f/u next week

## 2020-03-16 LAB — SURGICAL PATHOLOGY STUDY: SIGNIFICANT CHANGE UP

## 2020-03-18 ENCOUNTER — APPOINTMENT (OUTPATIENT)
Dept: OTOLARYNGOLOGY | Facility: CLINIC | Age: 45
End: 2020-03-18
Payer: MEDICAID

## 2020-03-18 VITALS
HEART RATE: 76 BPM | WEIGHT: 178 LBS | DIASTOLIC BLOOD PRESSURE: 73 MMHG | HEIGHT: 62 IN | SYSTOLIC BLOOD PRESSURE: 123 MMHG | BODY MASS INDEX: 32.76 KG/M2

## 2020-03-18 DIAGNOSIS — H71.22 CHOLESTEATOMA OF MASTOID, LEFT EAR: ICD-10-CM

## 2020-03-18 DIAGNOSIS — H90.5 UNSPECIFIED SENSORINEURAL HEARING LOSS: ICD-10-CM

## 2020-03-18 DIAGNOSIS — H70.11 CHRONIC MASTOIDITIS, RIGHT EAR: ICD-10-CM

## 2020-03-18 PROCEDURE — 99024 POSTOP FOLLOW-UP VISIT: CPT

## 2020-03-18 NOTE — HISTORY OF PRESENT ILLNESS
[Hearing Loss] : hearing loss [Presbycusis] : presbycusis [Prior Ear Surgery] : prior ear surgery [Cholesteatoma] : cholesteatoma [de-identified] : 43 yo male\par Patient presents s/p  Left  tympanomastoidectomy and right mastoid debridement 3/10/2020 \par States he has minor ear pain, taking Tylenol for pain. Otherwise feeling okay.\par no other modifying factors\par \par  [Vertigo] : no vertigo [Anxiety] : no anxiety [Dizziness] : no dizziness [Headache] : no headache [Recurrent Otitis Media] : no recurrent otitis media [Otitis Media with Effusion] : no otitis media with effusion [Congenital Ear Malformation] : no congenital ear malformation [Meniere Disease] : no Meniere disease [Otosclerosis] : no otosclerosis [Perilymphatic Fistula] : no perilymphatic fistula [Loud Noise Exposure] : no history of loud noise exposure [Smoking] : no smoking [Early Onset Hearing Loss] : no early onset hearing loss [Stroke] : no stroke [Facial Pain] : no facial pain [Facial Pressure] : no facial pressure [Nasal Congestion] : no nasal congestion [Allergic Rhinitis] : no allergic rhinitis [Maxillary Tooth Infection] : no maxillary tooth infection [Septal Deviation] : no septal deviation [Environmental Allergies] : no environmental allergies [Seasonal Allergies] : no seasonal allergies [Environmental Allergens] : no environmental allergens [Adenoidectomy] : no adenoidectomy [Nasal FB Removal] : no nasal foreign body removal [Allergies] : no allergies [Asthma] : no asthma [Neck Mass] : no neck mass [Neck Pain] : no neck pain [Chills] : no chills [Cold Intolerance] : no cold intolerance [Cough] : no cough [Fatigue] : no fatigue [Heat Intolerance] : no heat intolerance [Hyperthyroidism] : no hyperthyroidism [Sialadenitis] : no sialadenitis [Hodgkin Disease] : no hodgkin disease [Non-Hodgkin Lymphoma] : no non-hodgkin lymphoma [Tobacco Use] : no tobacco use [Alcohol Use] : no alcohol use [Graves Disease] : no graves disease [Thyroid Cancer] : no thyroid cancer

## 2020-03-18 NOTE — ASSESSMENT
[FreeTextEntry1] : Patient presents s/p Left  tympanomastoidectomy CWD- 3/10/2020\par feeling well \par rx: neomycin drops to left ear bid\par \par \par f/u 1 month and prn

## 2020-03-18 NOTE — PHYSICAL EXAM
[Midline] : trachea located in midline position [de-identified] : right moist debris removed and otic drops placed   left not visualized-packing in place [Normal] : salivary glands are normal

## 2020-04-22 ENCOUNTER — APPOINTMENT (OUTPATIENT)
Dept: OTOLARYNGOLOGY | Facility: CLINIC | Age: 45
End: 2020-04-22

## 2024-11-22 ENCOUNTER — APPOINTMENT (OUTPATIENT)
Dept: PULMONOLOGY | Facility: CLINIC | Age: 49
End: 2024-11-22